# Patient Record
Sex: FEMALE | Race: BLACK OR AFRICAN AMERICAN
[De-identification: names, ages, dates, MRNs, and addresses within clinical notes are randomized per-mention and may not be internally consistent; named-entity substitution may affect disease eponyms.]

---

## 2022-01-20 ENCOUNTER — HOSPITAL ENCOUNTER (INPATIENT)
Dept: HOSPITAL 54 - ER | Age: 42
LOS: 2 days | Discharge: HOME | DRG: 245 | End: 2022-01-22
Attending: NURSE PRACTITIONER | Admitting: INTERNAL MEDICINE
Payer: MEDICAID

## 2022-01-20 VITALS — HEIGHT: 61 IN | WEIGHT: 98 LBS | BODY MASS INDEX: 18.5 KG/M2

## 2022-01-20 DIAGNOSIS — E87.6: ICD-10-CM

## 2022-01-20 DIAGNOSIS — E83.51: ICD-10-CM

## 2022-01-20 DIAGNOSIS — Z20.822: ICD-10-CM

## 2022-01-20 DIAGNOSIS — K51.911: Primary | ICD-10-CM

## 2022-01-20 DIAGNOSIS — E88.09: ICD-10-CM

## 2022-01-20 DIAGNOSIS — E87.1: ICD-10-CM

## 2022-01-20 DIAGNOSIS — E86.1: ICD-10-CM

## 2022-01-20 DIAGNOSIS — Z86.16: ICD-10-CM

## 2022-01-20 DIAGNOSIS — D62: ICD-10-CM

## 2022-01-20 DIAGNOSIS — E43: ICD-10-CM

## 2022-01-20 DIAGNOSIS — D75.839: ICD-10-CM

## 2022-01-20 LAB
ALBUMIN SERPL BCP-MCNC: 1.4 G/DL (ref 3.4–5)
ALP SERPL-CCNC: 120 U/L (ref 46–116)
ALT SERPL W P-5'-P-CCNC: 22 U/L (ref 12–78)
AST SERPL W P-5'-P-CCNC: 20 U/L (ref 15–37)
BASOPHILS # BLD AUTO: 0 K/UL (ref 0–0.2)
BASOPHILS NFR BLD AUTO: 0.2 % (ref 0–2)
BILIRUB DIRECT SERPL-MCNC: 0.1 MG/DL (ref 0–0.2)
BILIRUB SERPL-MCNC: 0.3 MG/DL (ref 0.2–1)
BUN SERPL-MCNC: 12 MG/DL (ref 7–18)
CALCIUM SERPL-MCNC: 8 MG/DL (ref 8.5–10.1)
CHLORIDE SERPL-SCNC: 88 MMOL/L (ref 98–107)
CO2 SERPL-SCNC: 34 MMOL/L (ref 21–32)
CREAT SERPL-MCNC: 1.1 MG/DL (ref 0.6–1.3)
EOSINOPHIL NFR BLD AUTO: 1.1 % (ref 0–6)
GLUCOSE SERPL-MCNC: 108 MG/DL (ref 74–106)
HCT VFR BLD AUTO: 24 % (ref 33–45)
HGB BLD-MCNC: 7.7 G/DL (ref 11.5–14.8)
LYMPHOCYTES NFR BLD AUTO: 16.7 % (ref 20–44)
LYMPHOCYTES NFR BLD AUTO: 2.3 K/UL (ref 0.8–4.8)
LYMPHOCYTES NFR BLD MANUAL: 15 % (ref 16–48)
MCHC RBC AUTO-ENTMCNC: 33 G/DL (ref 31–36)
MCV RBC AUTO: 81 FL (ref 82–100)
MONOCYTES NFR BLD AUTO: 0.6 K/UL (ref 0.1–1.3)
MONOCYTES NFR BLD AUTO: 4.3 % (ref 2–12)
MONOCYTES NFR BLD MANUAL: 5 % (ref 0–11)
NEUTROPHILS # BLD AUTO: 10.8 K/UL (ref 1.8–8.9)
NEUTROPHILS NFR BLD AUTO: 77.7 % (ref 43–81)
NEUTS BAND NFR BLD MANUAL: 19 % (ref 0–5)
NEUTS SEG NFR BLD MANUAL: 61 % (ref 42–76)
PLATELET # BLD AUTO: 705 K/UL (ref 150–450)
POTASSIUM SERPL-SCNC: 2.6 MMOL/L (ref 3.5–5.1)
PROT SERPL-MCNC: 7.7 G/DL (ref 6.4–8.2)
RBC # BLD AUTO: 2.91 MIL/UL (ref 4–5.2)
SODIUM SERPL-SCNC: 128 MMOL/L (ref 136–145)
WBC NRBC COR # BLD AUTO: 13.8 K/UL (ref 4.3–11)

## 2022-01-20 PROCEDURE — G0378 HOSPITAL OBSERVATION PER HR: HCPCS

## 2022-01-20 PROCEDURE — P9016 RBC LEUKOCYTES REDUCED: HCPCS

## 2022-01-20 PROCEDURE — C9803 HOPD COVID-19 SPEC COLLECT: HCPCS

## 2022-01-20 PROCEDURE — C9113 INJ PANTOPRAZOLE SODIUM, VIA: HCPCS

## 2022-01-20 RX ADMIN — POTASSIUM CHLORIDE SCH MLS/HR: 200 INJECTION, SOLUTION INTRAVENOUS at 23:16

## 2022-01-20 NOTE — NUR
PATIENT BIBMOTHER C/O LOW H/H FROM URGENT CARE. PATIENT IS A/O X 4, RR EVEN AND 
UNLABORED, NO SOB NOTED. PATIENT TO WEAK TO STAND ON HER OWN. STAND BY ASSIST. 
PATIENT PLACED IN ER BED. PATIENT CONNECTED TO CARDIAC MONITORS.

## 2022-01-21 LAB
BASOPHILS # BLD AUTO: 0 K/UL (ref 0–0.2)
BASOPHILS NFR BLD AUTO: 0.3 % (ref 0–2)
BUN SERPL-MCNC: 10 MG/DL (ref 7–18)
CALCIUM SERPL-MCNC: 7.3 MG/DL (ref 8.5–10.1)
CHLORIDE SERPL-SCNC: 94 MMOL/L (ref 98–107)
CO2 SERPL-SCNC: 34 MMOL/L (ref 21–32)
CREAT SERPL-MCNC: 0.8 MG/DL (ref 0.6–1.3)
EOSINOPHIL NFR BLD AUTO: 1.5 % (ref 0–6)
EOSINOPHIL NFR BLD MANUAL: 2 % (ref 0–4)
GLUCOSE SERPL-MCNC: 87 MG/DL (ref 74–106)
HCT VFR BLD AUTO: 20 % (ref 33–45)
HCT VFR BLD AUTO: 21 % (ref 33–45)
HGB BLD-MCNC: 6.5 G/DL (ref 11.5–14.8)
HGB BLD-MCNC: 6.9 G/DL (ref 11.5–14.8)
LYMPHOCYTES NFR BLD AUTO: 16.9 % (ref 20–44)
LYMPHOCYTES NFR BLD AUTO: 2.1 K/UL (ref 0.8–4.8)
LYMPHOCYTES NFR BLD MANUAL: 20 % (ref 16–48)
MAGNESIUM SERPL-MCNC: 2.6 MG/DL (ref 1.8–2.4)
MCHC RBC AUTO-ENTMCNC: 33 G/DL (ref 31–36)
MCV RBC AUTO: 81 FL (ref 82–100)
MONOCYTES NFR BLD AUTO: 0.8 K/UL (ref 0.1–1.3)
MONOCYTES NFR BLD AUTO: 6.2 % (ref 2–12)
MONOCYTES NFR BLD MANUAL: 7 % (ref 0–11)
NEUTROPHILS # BLD AUTO: 9.2 K/UL (ref 1.8–8.9)
NEUTROPHILS NFR BLD AUTO: 75.1 % (ref 43–81)
NEUTS BAND NFR BLD MANUAL: 20 % (ref 0–5)
NEUTS SEG NFR BLD MANUAL: 51 % (ref 42–76)
PHOSPHATE SERPL-MCNC: 4 MG/DL (ref 2.5–4.9)
PLATELET # BLD AUTO: 551 K/UL (ref 150–450)
POTASSIUM SERPL-SCNC: 3.3 MMOL/L (ref 3.5–5.1)
RBC # BLD AUTO: 2.46 MIL/UL (ref 4–5.2)
SODIUM SERPL-SCNC: 131 MMOL/L (ref 136–145)
WBC NRBC COR # BLD AUTO: 12.2 K/UL (ref 4.3–11)

## 2022-01-21 PROCEDURE — 30233N1 TRANSFUSION OF NONAUTOLOGOUS RED BLOOD CELLS INTO PERIPHERAL VEIN, PERCUTANEOUS APPROACH: ICD-10-PCS | Performed by: EMERGENCY MEDICINE

## 2022-01-21 RX ADMIN — POTASSIUM CHLORIDE SCH MLS/HR: 200 INJECTION, SOLUTION INTRAVENOUS at 01:23

## 2022-01-21 RX ADMIN — POTASSIUM CHLORIDE SCH MLS/HR: 200 INJECTION, SOLUTION INTRAVENOUS at 03:00

## 2022-01-21 RX ADMIN — SODIUM CHLORIDE SCH MG: 9 INJECTION, SOLUTION INTRAVENOUS at 09:04

## 2022-01-21 RX ADMIN — MAGNESIUM HYDROXIDE PRN ML: 400 SUSPENSION ORAL at 20:12

## 2022-01-21 RX ADMIN — SODIUM CHLORIDE SCH MG: 9 INJECTION, SOLUTION INTRAVENOUS at 01:30

## 2022-01-21 RX ADMIN — SODIUM CHLORIDE PRN MLS/HR: 9 INJECTION, SOLUTION INTRAVENOUS at 03:11

## 2022-01-21 RX ADMIN — POTASSIUM CHLORIDE SCH MLS/HR: 200 INJECTION, SOLUTION INTRAVENOUS at 00:22

## 2022-01-21 RX ADMIN — SODIUM CHLORIDE SCH MG: 9 INJECTION, SOLUTION INTRAVENOUS at 21:30

## 2022-01-21 RX ADMIN — POTASSIUM CHLORIDE SCH MLS/HR: 200 INJECTION, SOLUTION INTRAVENOUS at 13:01

## 2022-01-21 RX ADMIN — POTASSIUM CHLORIDE SCH MLS/HR: 200 INJECTION, SOLUTION INTRAVENOUS at 12:00

## 2022-01-21 RX ADMIN — ACETAMINOPHEN PRN MG: 325 TABLET ORAL at 12:31

## 2022-01-21 NOTE — NUR
PER NP SHAH ORDER: DISCONTINUE NPO. NEW ORDER OD REGULAR DIET STARTING NOW. THE 
ORDER IS READ BACK, VERIFIED. NOTED AND CARRIED OUT.

## 2022-01-22 VITALS — SYSTOLIC BLOOD PRESSURE: 125 MMHG | DIASTOLIC BLOOD PRESSURE: 79 MMHG

## 2022-01-22 VITALS — SYSTOLIC BLOOD PRESSURE: 132 MMHG | DIASTOLIC BLOOD PRESSURE: 82 MMHG

## 2022-01-22 LAB
BASOPHILS # BLD AUTO: 0 K/UL (ref 0–0.2)
BASOPHILS NFR BLD AUTO: 0.1 % (ref 0–2)
BUN SERPL-MCNC: 14 MG/DL (ref 7–18)
CALCIUM SERPL-MCNC: 7.7 MG/DL (ref 8.5–10.1)
CHLORIDE SERPL-SCNC: 100 MMOL/L (ref 98–107)
CO2 SERPL-SCNC: 29 MMOL/L (ref 21–32)
CREAT SERPL-MCNC: 0.8 MG/DL (ref 0.6–1.3)
EOSINOPHIL NFR BLD AUTO: 0 % (ref 0–6)
GLUCOSE SERPL-MCNC: 128 MG/DL (ref 74–106)
HCT VFR BLD AUTO: 23 % (ref 33–45)
HGB BLD-MCNC: 7.8 G/DL (ref 11.5–14.8)
IRON SERPL-MCNC: 18 UG/DL (ref 50–175)
LYMPHOCYTES NFR BLD AUTO: 0.8 K/UL (ref 0.8–4.8)
LYMPHOCYTES NFR BLD AUTO: 7.9 % (ref 20–44)
MAGNESIUM SERPL-MCNC: 2.5 MG/DL (ref 1.8–2.4)
MCHC RBC AUTO-ENTMCNC: 34 G/DL (ref 31–36)
MCV RBC AUTO: 79 FL (ref 82–100)
MONOCYTES NFR BLD AUTO: 0.7 K/UL (ref 0.1–1.3)
MONOCYTES NFR BLD AUTO: 6.6 % (ref 2–12)
NEUTROPHILS # BLD AUTO: 8.9 K/UL (ref 1.8–8.9)
NEUTROPHILS NFR BLD AUTO: 85.4 % (ref 43–81)
PLATELET # BLD AUTO: 525 K/UL (ref 150–450)
POTASSIUM SERPL-SCNC: 4 MMOL/L (ref 3.5–5.1)
RBC # BLD AUTO: 2.93 MIL/UL (ref 4–5.2)
SODIUM SERPL-SCNC: 134 MMOL/L (ref 136–145)
TIBC SERPL-MCNC: 124 UG/DL (ref 250–450)
WBC NRBC COR # BLD AUTO: 10.5 K/UL (ref 4.3–11)

## 2022-01-22 RX ADMIN — SODIUM CHLORIDE PRN MLS/HR: 9 INJECTION, SOLUTION INTRAVENOUS at 05:51

## 2022-01-22 RX ADMIN — MAGNESIUM HYDROXIDE PRN ML: 400 SUSPENSION ORAL at 10:02

## 2022-01-22 RX ADMIN — SODIUM CHLORIDE SCH MG: 9 INJECTION, SOLUTION INTRAVENOUS at 10:02

## 2022-01-22 RX ADMIN — ACETAMINOPHEN PRN MG: 325 TABLET ORAL at 06:16

## 2022-01-22 RX ADMIN — ACETAMINOPHEN PRN MG: 325 TABLET ORAL at 14:30

## 2022-01-22 NOTE — NUR
TELE RN DISCHARGE NOTES.

PATIENT DISCHARGE IN STABLE CONDITION. VITAL SIGNS WITHIN NORMAL RANGES. NO PAIN NOTED. NO 
SOB NOTES. ALL BELONGINGS ACCOUNTED AND SIGNED FOR. 2 IV SITES REMOVED. COVERED WITH DRY 
DRESSING. NO BLEEDING NOTED. DISCHARGE PAPERS SIGNED BY PATIENT . PATIENT UNDERSTOOD ALL THE 
DISCHARGE PAPERS. GUME WHEEL THE PATIENT TO THE LOBBY. PATIENT LEFT AT 1614 WITH HER MOTHER 
IN STABLE CONDITION. CHARGE NURSE AND MD AWARE OF THE DISCHARGE.

## 2022-01-22 NOTE — NUR
TELE RN NOTES

RECEIVED PATIENT AT 0926 AM VIA [facility] . PATIENT ALERT AND ORIENTED TIMES 4. NO PAIN NOTED 
NO SOB NOTED, NO RESPIRATORY DISTRESS NOTED. ON TELE MONITORING.VITAL SIGNS WITHIN NORMAL 
RANGES. SKIN INTACT. IV ACCESS ON RAC #20 AND RIGHT HAND # 20. ABLE TO AMBULATE TO BATHROOM 
. ALL BELONGINGS RECORDED. BED LOCKED IN THE LOWEST POSITION. CALL LIGHT AND TABLE WITHIN 
REACH. WILL CONTINUE TO MONITOR THE PATIENT.